# Patient Record
Sex: MALE | Race: WHITE | NOT HISPANIC OR LATINO | Employment: OTHER | URBAN - METROPOLITAN AREA
[De-identification: names, ages, dates, MRNs, and addresses within clinical notes are randomized per-mention and may not be internally consistent; named-entity substitution may affect disease eponyms.]

---

## 2023-04-05 ENCOUNTER — TELEPHONE (OUTPATIENT)
Dept: PRIMARY CARE | Facility: CLINIC | Age: 83
End: 2023-04-05
Payer: MEDICARE

## 2023-04-05 DIAGNOSIS — I10 HYPERTENSION, UNSPECIFIED TYPE: ICD-10-CM

## 2023-04-05 DIAGNOSIS — I10 HYPERTENSION, UNSPECIFIED TYPE: Primary | ICD-10-CM

## 2023-04-05 RX ORDER — QUINAPRIL 40 MG/1
1 TABLET ORAL DAILY
COMMUNITY
End: 2023-04-05 | Stop reason: ALTCHOICE

## 2023-04-05 RX ORDER — LISINOPRIL 20 MG/1
20 TABLET ORAL DAILY
Qty: 90 TABLET | Refills: 3 | Status: SHIPPED | OUTPATIENT
Start: 2023-04-05 | End: 2023-05-09 | Stop reason: SDUPTHER

## 2023-04-05 NOTE — TELEPHONE ENCOUNTER
PT states that [facility] is out of stock of Quinapril 40 mg.  It is on back order.      PT wants to know if there is an alternative?      Only has two pills left.

## 2023-04-05 NOTE — TELEPHONE ENCOUNTER
Patient is taking quinapril 40mg once a day but this is on back order. Pharmacy called and said can you rx something else instead to replace this.

## 2023-05-09 ENCOUNTER — LAB (OUTPATIENT)
Dept: LAB | Facility: LAB | Age: 83
End: 2023-05-09
Payer: MEDICARE

## 2023-05-09 ENCOUNTER — OFFICE VISIT (OUTPATIENT)
Dept: PRIMARY CARE | Facility: CLINIC | Age: 83
End: 2023-05-09
Payer: MEDICARE

## 2023-05-09 VITALS
WEIGHT: 196.4 LBS | HEART RATE: 54 BPM | OXYGEN SATURATION: 97 % | BODY MASS INDEX: 30.83 KG/M2 | HEIGHT: 67 IN | SYSTOLIC BLOOD PRESSURE: 129 MMHG | DIASTOLIC BLOOD PRESSURE: 63 MMHG

## 2023-05-09 DIAGNOSIS — I10 HYPERTENSION, UNSPECIFIED TYPE: ICD-10-CM

## 2023-05-09 DIAGNOSIS — E78.5 HYPERLIPIDEMIA, UNSPECIFIED HYPERLIPIDEMIA TYPE: ICD-10-CM

## 2023-05-09 DIAGNOSIS — I10 ESSENTIAL HYPERTENSION: ICD-10-CM

## 2023-05-09 DIAGNOSIS — R73.9 ELEVATED BLOOD SUGAR: ICD-10-CM

## 2023-05-09 DIAGNOSIS — N40.0 BENIGN PROSTATIC HYPERPLASIA, UNSPECIFIED WHETHER LOWER URINARY TRACT SYMPTOMS PRESENT: ICD-10-CM

## 2023-05-09 DIAGNOSIS — K76.0 FATTY LIVER DISEASE, NONALCOHOLIC: ICD-10-CM

## 2023-05-09 DIAGNOSIS — Z00.00 ROUTINE GENERAL MEDICAL EXAMINATION AT A HEALTH CARE FACILITY: ICD-10-CM

## 2023-05-09 DIAGNOSIS — E78.5 HYPERLIPIDEMIA, UNSPECIFIED HYPERLIPIDEMIA TYPE: Primary | ICD-10-CM

## 2023-05-09 PROCEDURE — 36415 COLL VENOUS BLD VENIPUNCTURE: CPT

## 2023-05-09 PROCEDURE — 3074F SYST BP LT 130 MM HG: CPT | Performed by: INTERNAL MEDICINE

## 2023-05-09 PROCEDURE — 84443 ASSAY THYROID STIM HORMONE: CPT

## 2023-05-09 PROCEDURE — 3078F DIAST BP <80 MM HG: CPT | Performed by: INTERNAL MEDICINE

## 2023-05-09 PROCEDURE — 1159F MED LIST DOCD IN RCRD: CPT | Performed by: INTERNAL MEDICINE

## 2023-05-09 PROCEDURE — 80061 LIPID PANEL: CPT

## 2023-05-09 PROCEDURE — 80053 COMPREHEN METABOLIC PANEL: CPT

## 2023-05-09 PROCEDURE — 85027 COMPLETE CBC AUTOMATED: CPT

## 2023-05-09 PROCEDURE — 99214 OFFICE O/P EST MOD 30 MIN: CPT | Performed by: INTERNAL MEDICINE

## 2023-05-09 PROCEDURE — 83036 HEMOGLOBIN GLYCOSYLATED A1C: CPT

## 2023-05-09 PROCEDURE — 1036F TOBACCO NON-USER: CPT | Performed by: INTERNAL MEDICINE

## 2023-05-09 RX ORDER — HYDROCHLOROTHIAZIDE 12.5 MG/1
1 TABLET ORAL DAILY
COMMUNITY
End: 2023-05-09 | Stop reason: SDUPTHER

## 2023-05-09 RX ORDER — LISINOPRIL 20 MG/1
20 TABLET ORAL DAILY
Qty: 90 TABLET | Refills: 3 | Status: SHIPPED | OUTPATIENT
Start: 2023-05-09 | End: 2023-11-20 | Stop reason: SDUPTHER

## 2023-05-09 RX ORDER — MULTIVITAMIN
TABLET ORAL
COMMUNITY

## 2023-05-09 RX ORDER — QUINAPRIL 40 MG/1
40 TABLET ORAL DAILY
COMMUNITY
End: 2023-05-09 | Stop reason: ALTCHOICE

## 2023-05-09 RX ORDER — PSYLLIUM SEED
PACKET (EA) ORAL
COMMUNITY

## 2023-05-09 RX ORDER — HYDROCHLOROTHIAZIDE 12.5 MG/1
12.5 TABLET ORAL DAILY
Qty: 90 TABLET | Refills: 3 | Status: SHIPPED | OUTPATIENT
Start: 2023-05-09 | End: 2023-11-20 | Stop reason: SDUPTHER

## 2023-05-09 RX ORDER — ATENOLOL 50 MG/1
1 TABLET ORAL DAILY
COMMUNITY
Start: 2018-03-13 | End: 2023-05-09 | Stop reason: SDUPTHER

## 2023-05-09 RX ORDER — LUTEIN 6 MG
20 TABLET ORAL
COMMUNITY
End: 2023-05-09 | Stop reason: SDUPTHER

## 2023-05-09 RX ORDER — OMEPRAZOLE 20 MG/1
20 CAPSULE, DELAYED RELEASE ORAL DAILY
Qty: 90 CAPSULE | Refills: 3 | Status: SHIPPED | OUTPATIENT
Start: 2023-05-09 | End: 2023-12-04 | Stop reason: SDUPTHER

## 2023-05-09 RX ORDER — LUTEIN 6 MG
TABLET ORAL
Qty: 90 TABLET | Refills: 3 | Status: SHIPPED | OUTPATIENT
Start: 2023-05-09 | End: 2023-11-20 | Stop reason: SDUPTHER

## 2023-05-09 RX ORDER — OMEPRAZOLE 20 MG/1
1 CAPSULE, DELAYED RELEASE ORAL DAILY
COMMUNITY
End: 2023-05-09 | Stop reason: SDUPTHER

## 2023-05-09 RX ORDER — ATENOLOL 50 MG/1
50 TABLET ORAL DAILY
Qty: 90 TABLET | Refills: 3 | Status: SHIPPED | OUTPATIENT
Start: 2023-05-09 | End: 2023-11-20 | Stop reason: SDUPTHER

## 2023-05-09 NOTE — PROGRESS NOTES
"Subjective   Patient ID: Gregory Juarez is a 83 y.o. male who presents for the following  Follow up    Medicare wellness November 2022  Assessment/Plan   htn: stable, changed to lisinopril from quinapril      hld: TG elevated. patient will do diet modification     fatty liver disease: recommend diet and weight loss     BPH: getting psa through urologist      [facility] ordered      check cbc cmp, lipid panel and vit d     HPI  male with BPH, fatty infiltration of the liver, urinary outlet obstruction, status post TURP, IGT, HTN, hyperlipidemia, plantar fasciitis, GERD, metabolic syndrome, and BPPV comes for         impaired fasting glucose a1c 5.6 . watching simple carbohydrates      HLD: Patient denies any muscle aches and is tolerating statin therapy     HTN: patient denies any headaches, blurred vision or dizziness. patient denies any stroke like symptoms.      bph: follows with dr carbone. s/p TURP. he gets psa done through urology      fatty liver disease: patient had a n ultrasound in 2015 with the diagnosis           Visit Vitals  /63   Pulse 54   Ht 1.689 m (5' 6.5\")   Wt 89.1 kg (196 lb 6.4 oz)   SpO2 97%   BMI 31.22 kg/m²   Smoking Status Former   BSA 2.04 m²     PHYSICAL EXAM   General appearance: Alert and in no acute distress. speech is clear and coherent  Neck: no carotid bruits or thyromegaly. no lymphadenopathy   Respiratory : No respiratory distress, normal respiratory rhythm and effort. Clear bilateral breath sounds. No wheezing or rhonchi.   Cardiovascular: heart rate regular, S1, S2. no murmurs. no Lower extremity edema  Skin inspection: Normal skin color and pigmentation, normal skin turgor and no visible rash, induration, or cellulitis  MSK: 5/5 strength upper and lower extremities without gait abnormalities. no loss of muscle mass   Neuro: 2-12 CN grossly intact.  no slurred speech. no lateralizing deficit  Psychiatric Orientation: Oriented to person, place, and time. no depression, " homicidal or suicidal thoughts, normal affect  Abdomen: soft, none tender, none distended. no organomegaly      REVIEW OF SYSTEMS   Constitutional: not feeling tired and no fever, chills or sweats. Denies weight loss  no headache  Cardiovascular: no exertional chest pain, no palpitations, no lower extremity edema and no intermittent leg claudication.   Lungs: Denies shortness of breath, exertional dyspnea, wheezing  Gastrointestinal: no change in bowel habits, no diarrhea, no nausea, no vomiting and no abdominal pain. Denies Melena, brbpr or dark stool  Musculoskeletal: no myalgias, no muscle weakness and no limb swelling.   Skin: no rashes, no change in skin color and pigmentation and no skin lumps.   Neurological: no headaches, no seizures, no numbness, no lateralizing deficits and no fainting.   Psychiatric: no depression and no anxiety.   Urine: denies polyuria, hematuria, dysuria       No Known Allergies    Current Outpatient Medications   Medication Sig Dispense Refill    atenolol (Tenormin) 50 mg tablet Take 1 tablet (50 mg) by mouth once daily.      hydroCHLOROthiazide (HYDRODiuril) 12.5 mg tablet Take 1 tablet (12.5 mg) by mouth once daily.      lisinopril 20 mg tablet Take 1 tablet (20 mg) by mouth once daily. 90 tablet 3    lutein 6 mg tablet Take 20 mg by mouth.      multivitamin tablet Take by mouth.      omeprazole (PriLOSEC) 20 mg DR capsule Take 1 capsule (20 mg) by mouth once daily.      psyllium (Metamucil, sugar,) powder Take by mouth.      quinapril (Accupril) 40 mg tablet Take 1 tablet (40 mg) by mouth once daily.       No current facility-administered medications for this visit.       Objective     No visits with results within 4 Month(s) from this visit.   Latest known visit with results is:   [facility] Encounter on 08/09/2022   Component Date Value Ref Range Status    Hemoglobin A1C 08/09/2022 5.6  % Final    Estimated Average Glucose 08/09/2022 114  MG/DL Final    Cholesterol 08/09/2022 130  0  - 199 mg/dL Final    HDL 08/09/2022 39.5 (A)  mg/dL Final    Cholesterol/HDL Ratio 08/09/2022 3.3   Final    LDL 08/09/2022 61  0 - 99 mg/dL Final    VLDL 08/09/2022 29  0 - 40 mg/dL Final    Triglycerides 08/09/2022 147  0 - 149 mg/dL Final    Glucose 08/09/2022 123 (H)  74 - 99 mg/dL Final    Sodium 08/09/2022 136  136 - 145 mmol/L Final    Potassium 08/09/2022 4.6  3.5 - 5.3 mmol/L Final    Chloride 08/09/2022 101  98 - 107 mmol/L Final    Bicarbonate 08/09/2022 27  21 - 32 mmol/L Final    Anion Gap 08/09/2022 13  10 - 20 mmol/L Final    Urea Nitrogen 08/09/2022 18  6 - 23 mg/dL Final    Creatinine 08/09/2022 1.07  0.50 - 1.30 mg/dL Final    GFR MALE 08/09/2022 69  >90 mL/min/1.73m2 Final    Calcium 08/09/2022 9.5  8.6 - 10.6 mg/dL Final    Albumin 08/09/2022 4.5  3.4 - 5.0 g/dL Final    Alkaline Phosphatase 08/09/2022 51  33 - 136 U/L Final    Total Protein 08/09/2022 6.7  6.4 - 8.2 g/dL Final    AST 08/09/2022 27  9 - 39 U/L Final    Total Bilirubin 08/09/2022 0.9  0.0 - 1.2 mg/dL Final    ALT (SGPT) 08/09/2022 28  10 - 52 U/L Final    WBC 08/09/2022 8.1  4.4 - 11.3 x10E9/L Final    nRBC 08/09/2022 0.0  0.0 - 0.0 /100 WBC Final    RBC 08/09/2022 5.30  4.50 - 5.90 x10E12/L Final    Hemoglobin 08/09/2022 15.3  13.5 - 17.5 g/dL Final    Hematocrit 08/09/2022 45.0  41.0 - 52.0 % Final    MCV 08/09/2022 85  80 - 100 fL Final    MCHC 08/09/2022 34.0  32.0 - 36.0 g/dL Final    Platelets 08/09/2022 225  150 - 450 x10E9/L Final    RDW 08/09/2022 13.2  11.5 - 14.5 % Final    Vitamin D, 25-Hydroxy 08/09/2022 60  ng/mL Final       Radiology: Reviewed imaging in powerchart.  No results found.    No family history on file.  Social History     Socioeconomic History    Marital status:      Spouse name: None    Number of children: None    Years of education: None    Highest education level: None   Occupational History    None   Tobacco Use    Smoking status: Former     Types: Cigarettes    Smokeless tobacco: Never    Vaping Use    Vaping status: None   Substance and Sexual Activity    Alcohol use: Not Currently    Drug use: None    Sexual activity: None   Other Topics Concern    None   Social History Narrative    None     Social Determinants of Health     Financial Resource Strain: Not on file   Food Insecurity: Not on file   Transportation Needs: Not on file   Physical Activity: Not on file   Stress: Not on file   Social Connections: Not on file   Intimate Partner Violence: Not on file   Housing Stability: Not on file     Past Medical History:   Diagnosis Date    Benign paroxysmal vertigo, unspecified ear 11/20/2018    BPV (benign positional vertigo)    Personal history of other diseases of the digestive system 04/06/2015    History of esophageal reflux     Past Surgical History:   Procedure Laterality Date    TRANSURETHRAL RESECTION OF PROSTATE  04/06/2015    Transurethral Resection Of Prostate (TURP)       Charting was completed using voice recognition technology and may include unintended errors.

## 2023-05-10 LAB
ALANINE AMINOTRANSFERASE (SGPT) (U/L) IN SER/PLAS: 27 U/L (ref 10–52)
ALBUMIN (G/DL) IN SER/PLAS: 4.8 G/DL (ref 3.4–5)
ALKALINE PHOSPHATASE (U/L) IN SER/PLAS: 57 U/L (ref 33–136)
ANION GAP IN SER/PLAS: 16 MMOL/L (ref 10–20)
ASPARTATE AMINOTRANSFERASE (SGOT) (U/L) IN SER/PLAS: 30 U/L (ref 9–39)
BILIRUBIN TOTAL (MG/DL) IN SER/PLAS: 0.8 MG/DL (ref 0–1.2)
CALCIUM (MG/DL) IN SER/PLAS: 10.4 MG/DL (ref 8.6–10.6)
CARBON DIOXIDE, TOTAL (MMOL/L) IN SER/PLAS: 26 MMOL/L (ref 21–32)
CHLORIDE (MMOL/L) IN SER/PLAS: 101 MMOL/L (ref 98–107)
CHOLESTEROL (MG/DL) IN SER/PLAS: 155 MG/DL (ref 0–199)
CHOLESTEROL IN HDL (MG/DL) IN SER/PLAS: 39.9 MG/DL
CHOLESTEROL/HDL RATIO: 3.9
CREATININE (MG/DL) IN SER/PLAS: 1.11 MG/DL (ref 0.5–1.3)
ERYTHROCYTE DISTRIBUTION WIDTH (RATIO) BY AUTOMATED COUNT: 13.7 % (ref 11.5–14.5)
ERYTHROCYTE MEAN CORPUSCULAR HEMOGLOBIN CONCENTRATION (G/DL) BY AUTOMATED: 33 G/DL (ref 32–36)
ERYTHROCYTE MEAN CORPUSCULAR VOLUME (FL) BY AUTOMATED COUNT: 89 FL (ref 80–100)
ERYTHROCYTES (10*6/UL) IN BLOOD BY AUTOMATED COUNT: 5.43 X10E12/L (ref 4.5–5.9)
ESTIMATED AVERAGE GLUCOSE FOR HBA1C: 111 MG/DL
GFR MALE: 66 ML/MIN/1.73M2
GLUCOSE (MG/DL) IN SER/PLAS: 119 MG/DL (ref 74–99)
HEMATOCRIT (%) IN BLOOD BY AUTOMATED COUNT: 48.5 % (ref 41–52)
HEMOGLOBIN (G/DL) IN BLOOD: 16 G/DL (ref 13.5–17.5)
HEMOGLOBIN A1C/HEMOGLOBIN TOTAL IN BLOOD: 5.5 %
LDL: 68 MG/DL (ref 0–99)
LEUKOCYTES (10*3/UL) IN BLOOD BY AUTOMATED COUNT: 8.8 X10E9/L (ref 4.4–11.3)
NON HDL CHOLESTEROL: 115 MG/DL
NRBC (PER 100 WBCS) BY AUTOMATED COUNT: 0 /100 WBC (ref 0–0)
PLATELETS (10*3/UL) IN BLOOD AUTOMATED COUNT: 218 X10E9/L (ref 150–450)
POTASSIUM (MMOL/L) IN SER/PLAS: 4.7 MMOL/L (ref 3.5–5.3)
PROTEIN TOTAL: 7.1 G/DL (ref 6.4–8.2)
SODIUM (MMOL/L) IN SER/PLAS: 138 MMOL/L (ref 136–145)
THYROTROPIN (MIU/L) IN SER/PLAS BY DETECTION LIMIT <= 0.05 MIU/L: 1.51 MIU/L (ref 0.44–3.98)
TRIGLYCERIDE (MG/DL) IN SER/PLAS: 234 MG/DL (ref 0–149)
UREA NITROGEN (MG/DL) IN SER/PLAS: 18 MG/DL (ref 6–23)
VLDL: 47 MG/DL (ref 0–40)

## 2023-05-16 ENCOUNTER — TELEPHONE (OUTPATIENT)
Dept: PRIMARY CARE | Facility: CLINIC | Age: 83
End: 2023-05-16
Payer: MEDICARE

## 2023-05-16 NOTE — TELEPHONE ENCOUNTER
----- Message from Robinson Smith DO sent at 5/12/2023  5:26 PM EDT -----  triglycerides are elevated, please limit simple carbohydrates and continue to do lifestyle modifications including dieting and exercising

## 2023-05-22 ENCOUNTER — TELEPHONE (OUTPATIENT)
Dept: PRIMARY CARE | Facility: CLINIC | Age: 83
End: 2023-05-22
Payer: MEDICARE

## 2023-08-15 ENCOUNTER — TELEPHONE (OUTPATIENT)
Dept: PRIMARY CARE | Facility: CLINIC | Age: 83
End: 2023-08-15
Payer: MEDICARE

## 2023-08-15 RX ORDER — NIRMATRELVIR AND RITONAVIR 150-100 MG
KIT ORAL
COMMUNITY
Start: 2023-08-12

## 2023-11-07 ENCOUNTER — APPOINTMENT (OUTPATIENT)
Dept: PRIMARY CARE | Facility: CLINIC | Age: 83
End: 2023-11-07
Payer: MEDICARE

## 2023-11-08 ENCOUNTER — OFFICE VISIT (OUTPATIENT)
Dept: PRIMARY CARE | Facility: CLINIC | Age: 83
End: 2023-11-08
Payer: MEDICARE

## 2023-11-08 VITALS
TEMPERATURE: 97 F | DIASTOLIC BLOOD PRESSURE: 70 MMHG | WEIGHT: 196 LBS | BODY MASS INDEX: 30.76 KG/M2 | SYSTOLIC BLOOD PRESSURE: 102 MMHG | HEIGHT: 67 IN | HEART RATE: 64 BPM

## 2023-11-08 DIAGNOSIS — Z00.00 WELLNESS EXAMINATION: Primary | ICD-10-CM

## 2023-11-08 DIAGNOSIS — E55.9 VITAMIN D DEFICIENCY: ICD-10-CM

## 2023-11-08 DIAGNOSIS — R73.01 IMPAIRED FASTING GLUCOSE: ICD-10-CM

## 2023-11-08 DIAGNOSIS — I10 HYPERTENSION, UNSPECIFIED TYPE: ICD-10-CM

## 2023-11-08 DIAGNOSIS — E78.5 HYPERLIPIDEMIA, UNSPECIFIED HYPERLIPIDEMIA TYPE: ICD-10-CM

## 2023-11-08 PROCEDURE — 1170F FXNL STATUS ASSESSED: CPT | Performed by: INTERNAL MEDICINE

## 2023-11-08 PROCEDURE — G0439 PPPS, SUBSEQ VISIT: HCPCS | Performed by: INTERNAL MEDICINE

## 2023-11-08 PROCEDURE — 3078F DIAST BP <80 MM HG: CPT | Performed by: INTERNAL MEDICINE

## 2023-11-08 PROCEDURE — 1159F MED LIST DOCD IN RCRD: CPT | Performed by: INTERNAL MEDICINE

## 2023-11-08 PROCEDURE — 3074F SYST BP LT 130 MM HG: CPT | Performed by: INTERNAL MEDICINE

## 2023-11-08 PROCEDURE — 1160F RVW MEDS BY RX/DR IN RCRD: CPT | Performed by: INTERNAL MEDICINE

## 2023-11-08 PROCEDURE — 99214 OFFICE O/P EST MOD 30 MIN: CPT | Performed by: INTERNAL MEDICINE

## 2023-11-08 PROCEDURE — 1036F TOBACCO NON-USER: CPT | Performed by: INTERNAL MEDICINE

## 2023-11-08 ASSESSMENT — ACTIVITIES OF DAILY LIVING (ADL)
TAKING_MEDICATION: INDEPENDENT
DOING_HOUSEWORK: INDEPENDENT
BATHING: INDEPENDENT
GROCERY_SHOPPING: INDEPENDENT
MANAGING_FINANCES: INDEPENDENT
DRESSING: INDEPENDENT

## 2023-11-08 ASSESSMENT — PATIENT HEALTH QUESTIONNAIRE - PHQ9
1. LITTLE INTEREST OR PLEASURE IN DOING THINGS: NOT AT ALL
2. FEELING DOWN, DEPRESSED OR HOPELESS: NOT AT ALL
SUM OF ALL RESPONSES TO PHQ9 QUESTIONS 1 AND 2: 0

## 2023-11-08 NOTE — PROGRESS NOTES
"Subjective   Patient ID: Gregory Juarez is a 83 y.o. male who presents for the following     Medicare wellness November 2023 and chronic follow   Assessment/Plan   htn: stable, changed to lisinopril from quinapril      hld: TG elevated. patient will do diet modification     fatty liver disease: recommend diet and weight loss     BPH: getting psa through urologist < Dr Rafa FONSECA ordered      check cbc cmp, lipid panel and vit d     HPI  male with BPH, fatty infiltration of the liver, urinary outlet obstruction, status post TURP, IGT, HTN, hyperlipidemia, plantar fasciitis, GERD, metabolic syndrome, and BPPV comes for         impaired fasting glucose a1c 5.6 . watching simple carbohydrates      HLD: Patient denies any muscle aches and is tolerating statin therapy     HTN: patient denies any headaches, blurred vision or dizziness. patient denies any stroke like symptoms.      bph: follows with dr carbone. s/p TURP. he gets psa done through urology      fatty liver disease: patient had a n ultrasound in 2015 with the diagnosis        to [facility]     Visit Vitals  /70   Pulse 64   Temp 36.1 °C (97 °F)   Ht 1.689 m (5' 6.5\")   Wt 88.9 kg (196 lb)   BMI 31.16 kg/m²   Smoking Status Former   BSA 2.04 m²     PHYSICAL EXAM   General appearance: Alert and in no acute distress. speech is clear and coherent  Neck: no carotid bruits or thyromegaly. no lymphadenopathy   Respiratory : No respiratory distress, normal respiratory rhythm and effort. Clear bilateral breath sounds. No wheezing or rhonchi.   Cardiovascular: heart rate regular, S1, S2. no murmurs. no Lower extremity edema  Skin inspection: Normal skin color and pigmentation, normal skin turgor and no visible rash, induration, or cellulitis  MSK: 5/5 strength upper and lower extremities without gait abnormalities. no loss of muscle mass   Neuro: 2-12 CN grossly intact.  no slurred speech. no lateralizing deficit  Psychiatric Orientation: " Oriented to person, place, and time. no depression, homicidal or suicidal thoughts, normal affect  Abdomen: soft, none tender, none distended. no organomegaly      REVIEW OF SYSTEMS   Constitutional: not feeling tired and no fever, chills or sweats. Denies weight loss  no headache  Cardiovascular: no exertional chest pain, no palpitations, no lower extremity edema and no intermittent leg claudication.   Lungs: Denies shortness of breath, exertional dyspnea, wheezing  Gastrointestinal: no change in bowel habits, no diarrhea, no nausea, no vomiting and no abdominal pain. Denies Melena, brbpr or dark stool  Musculoskeletal: no myalgias, no muscle weakness and no limb swelling.   Skin: no rashes, no change in skin color and pigmentation and no skin lumps.   Neurological: no headaches, no seizures, no numbness, no lateralizing deficits and no fainting.   Psychiatric: no depression and no anxiety.   Urine: denies polyuria, hematuria, dysuria       No Known Allergies    Current Outpatient Medications   Medication Sig Dispense Refill    atenolol (Tenormin) 50 mg tablet Take 1 tablet (50 mg) by mouth once daily. 90 tablet 3    hydroCHLOROthiazide (HYDRODiuril) 12.5 mg tablet Take 1 tablet (12.5 mg) by mouth once daily. 90 tablet 3    lutein 6 mg tablet Use as directed 90 tablet 3    multivitamin tablet Take by mouth.      omeprazole (PriLOSEC) 20 mg DR capsule Take 1 capsule (20 mg) by mouth once daily. 90 capsule 3    Paxlovid 150-100 mg tablet therapy pack TK 1 NIRMATRELVIR T AND 1 RITONAVIR T TOGETHER PO BID FOR 5 DAYS BID FOR 5 DAYS      psyllium (Metamucil, sugar,) powder Take by mouth.      lisinopril 20 mg tablet Take 1 tablet (20 mg) by mouth once daily. 90 tablet 3     No current facility-administered medications for this visit.       Objective     No visits with results within 4 Month(s) from this visit.   Latest known visit with results is:   Lab on 05/09/2023   Component Date Value Ref Range Status    WBC  05/09/2023 8.8  4.4 - 11.3 x10E9/L Final    nRBC 05/09/2023 0.0  0.0 - 0.0 /100 WBC Final    RBC 05/09/2023 5.43  4.50 - 5.90 x10E12/L Final    Hemoglobin 05/09/2023 16.0  13.5 - 17.5 g/dL Final    Hematocrit 05/09/2023 48.5  41.0 - 52.0 % Final    MCV 05/09/2023 89  80 - 100 fL Final    MCHC 05/09/2023 33.0  32.0 - 36.0 g/dL Final    Platelets 05/09/2023 218  150 - 450 x10E9/L Final    RDW 05/09/2023 13.7  11.5 - 14.5 % Final    Glucose 05/09/2023 119 (H)  74 - 99 mg/dL Final    Sodium 05/09/2023 138  136 - 145 mmol/L Final    Potassium 05/09/2023 4.7  3.5 - 5.3 mmol/L Final    Chloride 05/09/2023 101  98 - 107 mmol/L Final    Bicarbonate 05/09/2023 26  21 - 32 mmol/L Final    Anion Gap 05/09/2023 16  10 - 20 mmol/L Final    Urea Nitrogen 05/09/2023 18  6 - 23 mg/dL Final    Creatinine 05/09/2023 1.11  0.50 - 1.30 mg/dL Final    GFR MALE 05/09/2023 66  >90 mL/min/1.73m2 Final    Calcium 05/09/2023 10.4  8.6 - 10.6 mg/dL Final    Albumin 05/09/2023 4.8  3.4 - 5.0 g/dL Final    Alkaline Phosphatase 05/09/2023 57  33 - 136 U/L Final    Total Protein 05/09/2023 7.1  6.4 - 8.2 g/dL Final    AST 05/09/2023 30  9 - 39 U/L Final    Total Bilirubin 05/09/2023 0.8  0.0 - 1.2 mg/dL Final    ALT (SGPT) 05/09/2023 27  10 - 52 U/L Final    Hemoglobin A1C 05/09/2023 5.5  % Final    Estimated Average Glucose 05/09/2023 111  MG/DL Final    Cholesterol 05/09/2023 155  0 - 199 mg/dL Final    HDL 05/09/2023 39.9 (A)  mg/dL Final    Cholesterol/HDL Ratio 05/09/2023 3.9   Final    LDL 05/09/2023 68  0 - 99 mg/dL Final    VLDL 05/09/2023 47 (H)  0 - 40 mg/dL Final    Triglycerides 05/09/2023 234 (H)  0 - 149 mg/dL Final    Non HDL Cholesterol 05/09/2023 115  mg/dL Final    TSH 05/09/2023 1.51  0.44 - 3.98 mIU/L Final       Radiology: Reviewed imaging in powerchart.  No results found.    No family history on file.  Social History     Socioeconomic History    Marital status:      Spouse name: None    Number of children: None     Years of education: None    Highest education level: None   Occupational History    None   Tobacco Use    Smoking status: Former     Types: Cigarettes    Smokeless tobacco: Never   Substance and Sexual Activity    Alcohol use: Not Currently    Drug use: None    Sexual activity: None   Other Topics Concern    None   Social History Narrative    None     Social Determinants of Health     Financial Resource Strain: Not on file   Food Insecurity: Not on file   Transportation Needs: Not on file   Physical Activity: Not on file   Stress: Not on file   Social Connections: Not on file   Intimate Partner Violence: Not on file   Housing Stability: Not on file     Past Medical History:   Diagnosis Date    Benign paroxysmal vertigo, unspecified ear 11/20/2018    BPV (benign positional vertigo)    Personal history of other diseases of the digestive system 04/06/2015    History of esophageal reflux     Past Surgical History:   Procedure Laterality Date    TRANSURETHRAL RESECTION OF PROSTATE  04/06/2015    Transurethral Resection Of Prostate (TURP)       Charting was completed using voice recognition technology and may include unintended errors.

## 2023-11-17 ENCOUNTER — TELEPHONE (OUTPATIENT)
Dept: PRIMARY CARE | Facility: CLINIC | Age: 83
End: 2023-11-17
Payer: MEDICARE

## 2023-11-17 DIAGNOSIS — I10 HYPERTENSION, UNSPECIFIED TYPE: ICD-10-CM

## 2023-11-17 DIAGNOSIS — I10 ESSENTIAL HYPERTENSION: ICD-10-CM

## 2023-11-17 DIAGNOSIS — Z00.00 ROUTINE GENERAL MEDICAL EXAMINATION AT A HEALTH CARE FACILITY: ICD-10-CM

## 2023-11-20 ENCOUNTER — TELEPHONE (OUTPATIENT)
Dept: PRIMARY CARE | Facility: CLINIC | Age: 83
End: 2023-11-20
Payer: MEDICARE

## 2023-11-20 RX ORDER — HYDROCHLOROTHIAZIDE 12.5 MG/1
12.5 TABLET ORAL DAILY
Qty: 90 TABLET | Refills: 3 | Status: SHIPPED | OUTPATIENT
Start: 2023-11-20 | End: 2023-12-04 | Stop reason: SDUPTHER

## 2023-11-20 RX ORDER — ATENOLOL 50 MG/1
50 TABLET ORAL DAILY
Qty: 90 TABLET | Refills: 3 | Status: SHIPPED | OUTPATIENT
Start: 2023-11-20 | End: 2023-12-04 | Stop reason: SDUPTHER

## 2023-11-20 RX ORDER — LISINOPRIL 20 MG/1
20 TABLET ORAL DAILY
Qty: 90 TABLET | Refills: 3 | Status: SHIPPED | OUTPATIENT
Start: 2023-11-20 | End: 2023-12-04 | Stop reason: SDUPTHER

## 2023-11-20 RX ORDER — LUTEIN 6 MG
TABLET ORAL
Qty: 90 TABLET | Refills: 3 | Status: SHIPPED | OUTPATIENT
Start: 2023-11-20

## 2023-12-04 ENCOUNTER — TELEPHONE (OUTPATIENT)
Dept: PRIMARY CARE | Facility: CLINIC | Age: 83
End: 2023-12-04
Payer: MEDICARE

## 2023-12-04 DIAGNOSIS — Z00.00 ROUTINE GENERAL MEDICAL EXAMINATION AT A HEALTH CARE FACILITY: ICD-10-CM

## 2023-12-04 DIAGNOSIS — I10 ESSENTIAL HYPERTENSION: ICD-10-CM

## 2023-12-04 DIAGNOSIS — I10 HYPERTENSION, UNSPECIFIED TYPE: ICD-10-CM

## 2023-12-05 RX ORDER — HYDROCHLOROTHIAZIDE 12.5 MG/1
12.5 TABLET ORAL DAILY
Qty: 90 TABLET | Refills: 3 | Status: SHIPPED | OUTPATIENT
Start: 2023-12-05

## 2023-12-05 RX ORDER — OMEPRAZOLE 20 MG/1
20 CAPSULE, DELAYED RELEASE ORAL DAILY
Qty: 90 CAPSULE | Refills: 3 | Status: SHIPPED | OUTPATIENT
Start: 2023-12-05

## 2023-12-05 RX ORDER — LISINOPRIL 20 MG/1
20 TABLET ORAL DAILY
Qty: 90 TABLET | Refills: 3 | Status: SHIPPED | OUTPATIENT
Start: 2023-12-05

## 2023-12-05 RX ORDER — ATENOLOL 50 MG/1
50 TABLET ORAL DAILY
Qty: 90 TABLET | Refills: 3 | Status: SHIPPED | OUTPATIENT
Start: 2023-12-05

## 2024-02-27 ENCOUNTER — LAB (OUTPATIENT)
Dept: LAB | Facility: LAB | Age: 84
End: 2024-02-27
Payer: MEDICARE

## 2024-02-27 DIAGNOSIS — R73.01 IMPAIRED FASTING GLUCOSE: ICD-10-CM

## 2024-02-27 DIAGNOSIS — I10 HYPERTENSION, UNSPECIFIED TYPE: ICD-10-CM

## 2024-02-27 DIAGNOSIS — E78.5 HYPERLIPIDEMIA, UNSPECIFIED HYPERLIPIDEMIA TYPE: ICD-10-CM

## 2024-02-27 DIAGNOSIS — E55.9 VITAMIN D DEFICIENCY: ICD-10-CM

## 2024-02-27 LAB
25(OH)D3 SERPL-MCNC: 81 NG/ML (ref 30–100)
ALBUMIN SERPL BCP-MCNC: 4.4 G/DL (ref 3.4–5)
ALP SERPL-CCNC: 49 U/L (ref 33–136)
ALT SERPL W P-5'-P-CCNC: 21 U/L (ref 10–52)
ANION GAP SERPL CALC-SCNC: 16 MMOL/L (ref 10–20)
AST SERPL W P-5'-P-CCNC: 20 U/L (ref 9–39)
BILIRUB SERPL-MCNC: 1 MG/DL (ref 0–1.2)
BUN SERPL-MCNC: 20 MG/DL (ref 6–23)
CALCIUM SERPL-MCNC: 10.2 MG/DL (ref 8.6–10.6)
CHLORIDE SERPL-SCNC: 100 MMOL/L (ref 98–107)
CHOLEST SERPL-MCNC: 146 MG/DL (ref 0–199)
CHOLESTEROL/HDL RATIO: 3.9
CO2 SERPL-SCNC: 27 MMOL/L (ref 21–32)
CREAT SERPL-MCNC: 1.19 MG/DL (ref 0.5–1.3)
EGFRCR SERPLBLD CKD-EPI 2021: 61 ML/MIN/1.73M*2
ERYTHROCYTE [DISTWIDTH] IN BLOOD BY AUTOMATED COUNT: 13.6 % (ref 11.5–14.5)
EST. AVERAGE GLUCOSE BLD GHB EST-MCNC: 108 MG/DL
GLUCOSE SERPL-MCNC: 112 MG/DL (ref 74–99)
HBA1C MFR BLD: 5.4 %
HCT VFR BLD AUTO: 46.8 % (ref 41–52)
HDLC SERPL-MCNC: 37.3 MG/DL
HGB BLD-MCNC: 15.2 G/DL (ref 13.5–17.5)
LDLC SERPL CALC-MCNC: 56 MG/DL
MCH RBC QN AUTO: 28.4 PG (ref 26–34)
MCHC RBC AUTO-ENTMCNC: 32.5 G/DL (ref 32–36)
MCV RBC AUTO: 87 FL (ref 80–100)
NON HDL CHOLESTEROL: 109 MG/DL (ref 0–149)
NRBC BLD-RTO: 0 /100 WBCS (ref 0–0)
PLATELET # BLD AUTO: 217 X10*3/UL (ref 150–450)
POTASSIUM SERPL-SCNC: 4.6 MMOL/L (ref 3.5–5.3)
PROT SERPL-MCNC: 6.6 G/DL (ref 6.4–8.2)
RBC # BLD AUTO: 5.36 X10*6/UL (ref 4.5–5.9)
SODIUM SERPL-SCNC: 138 MMOL/L (ref 136–145)
TRIGL SERPL-MCNC: 262 MG/DL (ref 0–149)
TSH SERPL-ACNC: 1.65 MIU/L (ref 0.44–3.98)
VLDL: 52 MG/DL (ref 0–40)
WBC # BLD AUTO: 8 X10*3/UL (ref 4.4–11.3)

## 2024-02-27 PROCEDURE — 85027 COMPLETE CBC AUTOMATED: CPT

## 2024-02-27 PROCEDURE — 36415 COLL VENOUS BLD VENIPUNCTURE: CPT

## 2024-02-27 PROCEDURE — 80053 COMPREHEN METABOLIC PANEL: CPT

## 2024-02-27 PROCEDURE — 84443 ASSAY THYROID STIM HORMONE: CPT

## 2024-02-27 PROCEDURE — 83036 HEMOGLOBIN GLYCOSYLATED A1C: CPT

## 2024-02-27 PROCEDURE — 80061 LIPID PANEL: CPT

## 2024-02-27 PROCEDURE — 82306 VITAMIN D 25 HYDROXY: CPT

## 2024-03-01 ENCOUNTER — TELEPHONE (OUTPATIENT)
Dept: PRIMARY CARE | Facility: CLINIC | Age: 84
End: 2024-03-01
Payer: MEDICARE

## 2024-03-01 NOTE — TELEPHONE ENCOUNTER
----- Message from Robinson Smith DO sent at 3/1/2024  3:31 PM EST -----  triglycerides are elevated, please limit simple carbohydrates and continue to do lifestyle modifications including dieting and exercising

## 2024-05-13 ENCOUNTER — APPOINTMENT (OUTPATIENT)
Dept: PRIMARY CARE | Facility: CLINIC | Age: 84
End: 2024-05-13
Payer: MEDICARE

## 2024-06-12 ENCOUNTER — APPOINTMENT (OUTPATIENT)
Dept: PRIMARY CARE | Facility: CLINIC | Age: 84
End: 2024-06-12
Payer: MEDICARE

## 2024-06-12 ENCOUNTER — LAB (OUTPATIENT)
Dept: LAB | Facility: LAB | Age: 84
End: 2024-06-12
Payer: MEDICARE

## 2024-06-12 VITALS
WEIGHT: 187 LBS | DIASTOLIC BLOOD PRESSURE: 75 MMHG | HEIGHT: 67 IN | SYSTOLIC BLOOD PRESSURE: 129 MMHG | HEART RATE: 53 BPM | BODY MASS INDEX: 29.35 KG/M2

## 2024-06-12 DIAGNOSIS — I10 HYPERTENSION, UNSPECIFIED TYPE: Primary | ICD-10-CM

## 2024-06-12 DIAGNOSIS — I10 HYPERTENSION, UNSPECIFIED TYPE: ICD-10-CM

## 2024-06-12 DIAGNOSIS — R73.01 IMPAIRED FASTING GLUCOSE: ICD-10-CM

## 2024-06-12 DIAGNOSIS — R58 ECCHYMOSIS: ICD-10-CM

## 2024-06-12 DIAGNOSIS — E78.5 HYPERLIPIDEMIA, UNSPECIFIED HYPERLIPIDEMIA TYPE: ICD-10-CM

## 2024-06-12 LAB
ALBUMIN SERPL BCP-MCNC: 4.6 G/DL (ref 3.4–5)
ALP SERPL-CCNC: 57 U/L (ref 33–136)
ALT SERPL W P-5'-P-CCNC: 23 U/L (ref 10–52)
ANION GAP SERPL CALC-SCNC: 16 MMOL/L (ref 10–20)
AST SERPL W P-5'-P-CCNC: 24 U/L (ref 9–39)
BILIRUB SERPL-MCNC: 0.8 MG/DL (ref 0–1.2)
BUN SERPL-MCNC: 20 MG/DL (ref 6–23)
CALCIUM SERPL-MCNC: 10 MG/DL (ref 8.6–10.6)
CHLORIDE SERPL-SCNC: 103 MMOL/L (ref 98–107)
CO2 SERPL-SCNC: 22 MMOL/L (ref 21–32)
CREAT SERPL-MCNC: 1.01 MG/DL (ref 0.5–1.3)
EGFRCR SERPLBLD CKD-EPI 2021: 73 ML/MIN/1.73M*2
ERYTHROCYTE [DISTWIDTH] IN BLOOD BY AUTOMATED COUNT: 13 % (ref 11.5–14.5)
EST. AVERAGE GLUCOSE BLD GHB EST-MCNC: 114 MG/DL
GLUCOSE SERPL-MCNC: 111 MG/DL (ref 74–99)
HBA1C MFR BLD: 5.6 %
HCT VFR BLD AUTO: 44.7 % (ref 41–52)
HGB BLD-MCNC: 15.4 G/DL (ref 13.5–17.5)
INR PPP: 1.1 (ref 0.9–1.1)
MCH RBC QN AUTO: 29.3 PG (ref 26–34)
MCHC RBC AUTO-ENTMCNC: 34.5 G/DL (ref 32–36)
MCV RBC AUTO: 85 FL (ref 80–100)
NRBC BLD-RTO: 0 /100 WBCS (ref 0–0)
PLATELET # BLD AUTO: 207 X10*3/UL (ref 150–450)
POTASSIUM SERPL-SCNC: 3.9 MMOL/L (ref 3.5–5.3)
PROT SERPL-MCNC: 6.9 G/DL (ref 6.4–8.2)
PROTHROMBIN TIME: 12.1 SECONDS (ref 9.8–12.8)
RBC # BLD AUTO: 5.26 X10*6/UL (ref 4.5–5.9)
SODIUM SERPL-SCNC: 137 MMOL/L (ref 136–145)
TSH SERPL-ACNC: 1.43 MIU/L (ref 0.44–3.98)
WBC # BLD AUTO: 8.1 X10*3/UL (ref 4.4–11.3)

## 2024-06-12 PROCEDURE — 36415 COLL VENOUS BLD VENIPUNCTURE: CPT

## 2024-06-12 PROCEDURE — 1159F MED LIST DOCD IN RCRD: CPT | Performed by: INTERNAL MEDICINE

## 2024-06-12 PROCEDURE — 3078F DIAST BP <80 MM HG: CPT | Performed by: INTERNAL MEDICINE

## 2024-06-12 PROCEDURE — 3074F SYST BP LT 130 MM HG: CPT | Performed by: INTERNAL MEDICINE

## 2024-06-12 PROCEDURE — 84443 ASSAY THYROID STIM HORMONE: CPT

## 2024-06-12 PROCEDURE — G2211 COMPLEX E/M VISIT ADD ON: HCPCS | Performed by: INTERNAL MEDICINE

## 2024-06-12 PROCEDURE — 85610 PROTHROMBIN TIME: CPT

## 2024-06-12 PROCEDURE — 99214 OFFICE O/P EST MOD 30 MIN: CPT | Performed by: INTERNAL MEDICINE

## 2024-06-12 PROCEDURE — 1036F TOBACCO NON-USER: CPT | Performed by: INTERNAL MEDICINE

## 2024-06-12 PROCEDURE — 85027 COMPLETE CBC AUTOMATED: CPT

## 2024-06-12 PROCEDURE — 80053 COMPREHEN METABOLIC PANEL: CPT

## 2024-06-12 PROCEDURE — 83036 HEMOGLOBIN GLYCOSYLATED A1C: CPT

## 2024-06-12 NOTE — PROGRESS NOTES
"Subjective   Patient ID: Gregory Juarez is a 84 y.o. male who presents for the following  chronic follow  Medicare wellness November 2023    Assessment/Plan   Ecchymosis on the forearms: check inr    htn: stable, changed to lisinopril from quinapril      hld: TG elevated. patient will do diet modification     fatty liver disease: recommend diet and weight loss     BPH: getting psa through urologist < Dr Rafa FONSECA ordered      check cbc cmp, lipid panel and vit d     HPI  male with BPH, fatty infiltration of the liver, urinary outlet obstruction, status post TURP, IGT, HTN, hyperlipidemia, plantar fasciitis, GERD, metabolic syndrome, and BPPV comes for       bruising more frequently on his forarms noted. Denies any juan rectal bleeding    impaired fasting glucose a1c 5.6 . watching simple carbohydrates      HLD: Patient denies any muscle aches and is tolerating statin therapy     HTN: patient denies any headaches, blurred vision or dizziness. patient denies any stroke like symptoms.      bph: follows with dr carbone. s/p TURP. he gets psa done through urology      fatty liver disease: patient had a n ultrasound in 2015 with the diagnosis        to Fabiola     Visit Vitals  /75 (BP Location: Left arm, Patient Position: Sitting, BP Cuff Size: Adult)   Pulse 53   Ht 1.689 m (5' 6.5\")   Wt 84.8 kg (187 lb)   BMI 29.73 kg/m²   Smoking Status Former   BSA 1.99 m²     PHYSICAL EXAM   General appearance: Alert and in no acute distress. speech is clear and coherent    Respiratory : No respiratory distress, normal respiratory rhythm and effort. Clear bilateral breath sounds. No wheezing or rhonchi.   Cardiovascular: heart rate regular, S1, S2. no murmurs. no Lower extremity edema  Skin inspection: Normal skin color and pigmentation, normal skin turgor and no visible rash, induration, or cellulitis. Ecchymosis on the upper forearms.   MSK: 5/5 strength upper and lower extremities without gait " abnormalities. no loss of muscle mass   Neuro: 2-12 CN grossly intact.  no slurred speech. no lateralizing deficit  Psychiatric Orientation: Oriented to person, place, and time. no depression, homicidal or suicidal thoughts, normal affect       REVIEW OF SYSTEMS   Constitutional: not feeling tired and no fever, chills or sweats. Denies weight loss  no headache  Cardiovascular: no exertional chest pain, no palpitations, no lower extremity edema    Lungs: Denies shortness of breath, exertional dyspnea, wheezing  Gastrointestinal: no change in bowel habits, no diarrhea, no nausea, no vomiting and no abdominal pain. Denies Melena, brbpr or dark stool  Musculoskeletal: no myalgias, no muscle weakness and no limb swelling.    Neurological: no headaches, no seizures, no numbness, no lateralizing deficits and no fainting.   Psychiatric: no depression and no anxiety.   Urine: denies polyuria, hematuria, dysuria       No Known Allergies    Current Outpatient Medications   Medication Sig Dispense Refill    atenolol (Tenormin) 50 mg tablet Take 1 tablet (50 mg) by mouth once daily. 90 tablet 3    hydroCHLOROthiazide (HYDRODiuril) 12.5 mg tablet Take 1 tablet (12.5 mg) by mouth once daily. 90 tablet 3    lisinopril 20 mg tablet Take 1 tablet (20 mg) by mouth once daily. 90 tablet 3    lutein 6 mg tablet Use as directed 90 tablet 3    multivitamin tablet Take by mouth.      omeprazole (PriLOSEC) 20 mg DR capsule Take 1 capsule (20 mg) by mouth once daily. 90 capsule 3    Paxlovid 150-100 mg tablet therapy pack TK 1 NIRMATRELVIR T AND 1 RITONAVIR T TOGETHER PO BID FOR 5 DAYS BID FOR 5 DAYS      psyllium (Metamucil, sugar,) powder Take by mouth.       No current facility-administered medications for this visit.       Objective     Lab on 02/27/2024   Component Date Value Ref Range Status    WBC 02/27/2024 8.0  4.4 - 11.3 x10*3/uL Final    nRBC 02/27/2024 0.0  0.0 - 0.0 /100 WBCs Final    RBC 02/27/2024 5.36  4.50 - 5.90 x10*6/uL  Final    Hemoglobin 02/27/2024 15.2  13.5 - 17.5 g/dL Final    Hematocrit 02/27/2024 46.8  41.0 - 52.0 % Final    MCV 02/27/2024 87  80 - 100 fL Final    MCH 02/27/2024 28.4  26.0 - 34.0 pg Final    MCHC 02/27/2024 32.5  32.0 - 36.0 g/dL Final    RDW 02/27/2024 13.6  11.5 - 14.5 % Final    Platelets 02/27/2024 217  150 - 450 x10*3/uL Final    Glucose 02/27/2024 112 (H)  74 - 99 mg/dL Final    Sodium 02/27/2024 138  136 - 145 mmol/L Final    Potassium 02/27/2024 4.6  3.5 - 5.3 mmol/L Final    Chloride 02/27/2024 100  98 - 107 mmol/L Final    Bicarbonate 02/27/2024 27  21 - 32 mmol/L Final    Anion Gap 02/27/2024 16  10 - 20 mmol/L Final    Urea Nitrogen 02/27/2024 20  6 - 23 mg/dL Final    Creatinine 02/27/2024 1.19  0.50 - 1.30 mg/dL Final    eGFR 02/27/2024 61  >60 mL/min/1.73m*2 Final    Calcium 02/27/2024 10.2  8.6 - 10.6 mg/dL Final    Albumin 02/27/2024 4.4  3.4 - 5.0 g/dL Final    Alkaline Phosphatase 02/27/2024 49  33 - 136 U/L Final    Total Protein 02/27/2024 6.6  6.4 - 8.2 g/dL Final    AST 02/27/2024 20  9 - 39 U/L Final    Bilirubin, Total 02/27/2024 1.0  0.0 - 1.2 mg/dL Final    ALT 02/27/2024 21  10 - 52 U/L Final    Hemoglobin A1C 02/27/2024 5.4  see below % Final    Estimated Average Glucose 02/27/2024 108  Not Established mg/dL Final    Cholesterol 02/27/2024 146  0 - 199 mg/dL Final    HDL-Cholesterol 02/27/2024 37.3  mg/dL Final    Cholesterol/HDL Ratio 02/27/2024 3.9   Final    LDL Calculated 02/27/2024 56  <=99 mg/dL Final    VLDL 02/27/2024 52 (H)  0 - 40 mg/dL Final    Triglycerides 02/27/2024 262 (H)  0 - 149 mg/dL Final    Non HDL Cholesterol 02/27/2024 109  0 - 149 mg/dL Final    Thyroid Stimulating Hormone 02/27/2024 1.65  0.44 - 3.98 mIU/L Final    Vitamin D, 25-Hydroxy, Total 02/27/2024 81  30 - 100 ng/mL Final       Radiology: Reviewed imaging in powerchart.  No results found.    No family history on file.  Social History     Socioeconomic History    Marital status:      Spouse  name: None    Number of children: None    Years of education: None    Highest education level: None   Occupational History    None   Tobacco Use    Smoking status: Former     Types: Cigarettes    Smokeless tobacco: Never   Substance and Sexual Activity    Alcohol use: Not Currently    Drug use: None    Sexual activity: None   Other Topics Concern    None   Social History Narrative    None     Social Determinants of Health     Financial Resource Strain: Not on file   Food Insecurity: Not on file   Transportation Needs: Not on file   Physical Activity: Not on file   Stress: Not on file   Social Connections: Not on file   Intimate Partner Violence: Not on file   Housing Stability: Not on file     Past Medical History:   Diagnosis Date    Benign paroxysmal vertigo, unspecified ear 11/20/2018    BPV (benign positional vertigo)    Personal history of other diseases of the digestive system 04/06/2015    History of esophageal reflux     Past Surgical History:   Procedure Laterality Date    TRANSURETHRAL RESECTION OF PROSTATE  04/06/2015    Transurethral Resection Of Prostate (TURP)       Charting was completed using voice recognition technology and may include unintended errors.

## 2024-09-16 ENCOUNTER — CLINICAL SUPPORT (OUTPATIENT)
Dept: PRIMARY CARE | Facility: CLINIC | Age: 84
End: 2024-09-16
Payer: MEDICARE

## 2024-09-16 DIAGNOSIS — Z00.00 PREVENTATIVE HEALTH CARE: ICD-10-CM

## 2024-09-16 PROCEDURE — 90662 IIV NO PRSV INCREASED AG IM: CPT | Performed by: INTERNAL MEDICINE

## 2024-09-16 PROCEDURE — G0008 ADMIN INFLUENZA VIRUS VAC: HCPCS | Performed by: INTERNAL MEDICINE

## 2024-12-03 DIAGNOSIS — I10 ESSENTIAL HYPERTENSION: ICD-10-CM

## 2024-12-04 RX ORDER — HYDROCHLOROTHIAZIDE 12.5 MG/1
12.5 TABLET ORAL DAILY
Qty: 90 TABLET | Refills: 3 | Status: SHIPPED | OUTPATIENT
Start: 2024-12-04

## 2024-12-16 ENCOUNTER — APPOINTMENT (OUTPATIENT)
Dept: PRIMARY CARE | Facility: CLINIC | Age: 84
End: 2024-12-16
Payer: MEDICARE

## 2024-12-16 VITALS
OXYGEN SATURATION: 93 % | DIASTOLIC BLOOD PRESSURE: 85 MMHG | HEART RATE: 57 BPM | SYSTOLIC BLOOD PRESSURE: 158 MMHG | HEIGHT: 67 IN | BODY MASS INDEX: 29.73 KG/M2

## 2024-12-16 DIAGNOSIS — Z00.00 ROUTINE GENERAL MEDICAL EXAMINATION AT A HEALTH CARE FACILITY: ICD-10-CM

## 2024-12-16 DIAGNOSIS — Z00.00 WELLNESS EXAMINATION: Primary | ICD-10-CM

## 2024-12-16 DIAGNOSIS — E78.5 HYPERLIPIDEMIA, UNSPECIFIED HYPERLIPIDEMIA TYPE: ICD-10-CM

## 2024-12-16 DIAGNOSIS — I10 HYPERTENSION, UNSPECIFIED TYPE: ICD-10-CM

## 2024-12-16 DIAGNOSIS — R73.01 IMPAIRED FASTING GLUCOSE: ICD-10-CM

## 2024-12-16 PROCEDURE — 1159F MED LIST DOCD IN RCRD: CPT | Performed by: INTERNAL MEDICINE

## 2024-12-16 PROCEDURE — G0439 PPPS, SUBSEQ VISIT: HCPCS | Performed by: INTERNAL MEDICINE

## 2024-12-16 PROCEDURE — 3079F DIAST BP 80-89 MM HG: CPT | Performed by: INTERNAL MEDICINE

## 2024-12-16 PROCEDURE — 1170F FXNL STATUS ASSESSED: CPT | Performed by: INTERNAL MEDICINE

## 2024-12-16 PROCEDURE — 1160F RVW MEDS BY RX/DR IN RCRD: CPT | Performed by: INTERNAL MEDICINE

## 2024-12-16 PROCEDURE — 1036F TOBACCO NON-USER: CPT | Performed by: INTERNAL MEDICINE

## 2024-12-16 PROCEDURE — 99214 OFFICE O/P EST MOD 30 MIN: CPT | Performed by: INTERNAL MEDICINE

## 2024-12-16 PROCEDURE — 3077F SYST BP >= 140 MM HG: CPT | Performed by: INTERNAL MEDICINE

## 2024-12-16 RX ORDER — OMEPRAZOLE 20 MG/1
20 CAPSULE, DELAYED RELEASE ORAL DAILY
Qty: 90 CAPSULE | Refills: 3 | Status: SHIPPED | OUTPATIENT
Start: 2024-12-16

## 2024-12-16 RX ORDER — ATENOLOL 50 MG/1
50 TABLET ORAL DAILY
Qty: 90 TABLET | Refills: 3 | Status: SHIPPED | OUTPATIENT
Start: 2024-12-16

## 2024-12-16 RX ORDER — LUTEIN 6 MG
TABLET ORAL
Qty: 90 TABLET | Refills: 3 | Status: SHIPPED | OUTPATIENT
Start: 2024-12-16

## 2024-12-16 RX ORDER — LISINOPRIL 20 MG/1
20 TABLET ORAL DAILY
Qty: 90 TABLET | Refills: 3 | Status: SHIPPED | OUTPATIENT
Start: 2024-12-16

## 2024-12-16 ASSESSMENT — PATIENT HEALTH QUESTIONNAIRE - PHQ9
2. FEELING DOWN, DEPRESSED OR HOPELESS: NOT AT ALL
SUM OF ALL RESPONSES TO PHQ9 QUESTIONS 1 AND 2: 0
1. LITTLE INTEREST OR PLEASURE IN DOING THINGS: NOT AT ALL

## 2024-12-16 ASSESSMENT — ACTIVITIES OF DAILY LIVING (ADL)
BATHING: INDEPENDENT
DOING_HOUSEWORK: INDEPENDENT
DRESSING: INDEPENDENT
MANAGING_FINANCES: INDEPENDENT
GROCERY_SHOPPING: INDEPENDENT
TAKING_MEDICATION: INDEPENDENT

## 2024-12-16 NOTE — PROGRESS NOTES
Subjective   Patient ID: Gregory Juarez is a 84 y.o. male who presents for the following  chronic follow disease follow up and   Medicare wellness DECEMBER 2024  Assessment/Plan   Ecchymosis on the forearms: check inr    htn: stable, changed to lisinopril from quinapril      hld: TG elevated. patient will do diet modification     fatty liver disease: recommend diet and weight loss     BPH: getting psa through urologist < Dr Rafa FONSECA ordered      check cbc cmp, lipid panel and vit d     HPI  male with BPH, fatty infiltration of the liver, urinary outlet obstruction, status post TURP, IGT, HTN, hyperlipidemia, plantar fasciitis, GERD, metabolic syndrome, and BPPV comes for       bruising more frequently on his forarms noted. Denies any juan rectal bleeding    impaired fasting glucose a1c 5.6 . watching simple carbohydrates      HLD: Patient denies any muscle aches and is tolerating statin therapy     HTN: patient denies any headaches, blurred vision or dizziness. patient denies any stroke like symptoms.      bph: follows with dr carbone. s/p TURP. he gets psa done through urology      fatty liver disease: patient had a n ultrasound in 2015 with the diagnosis        to Fabiola     Visit Vitals  Smoking Status Former     PHYSICAL EXAM   General appearance: Alert and in no acute distress. speech is clear and coherent    Respiratory : No respiratory distress, normal respiratory rhythm and effort. Clear bilateral breath sounds. No wheezing or rhonchi.   Cardiovascular: heart rate regular, S1, S2. no murmurs. no Lower extremity edema  Skin inspection: Normal skin color and pigmentation, normal skin turgor and no visible rash, induration, or cellulitis. Ecchymosis on the upper forearms.   MSK: 5/5 strength upper and lower extremities without gait abnormalities. no loss of muscle mass   Neuro: 2-12 CN grossly intact.  no slurred speech. no lateralizing deficit  Psychiatric Orientation: Oriented to  person, place, and time. no depression, homicidal or suicidal thoughts, normal affect       REVIEW OF SYSTEMS   Constitutional: not feeling tired and no fever, chills or sweats. Denies weight loss  no headache  Cardiovascular: no exertional chest pain, no palpitations, no lower extremity edema    Lungs: Denies shortness of breath, exertional dyspnea, wheezing  Gastrointestinal: no change in bowel habits, no diarrhea, no nausea, no vomiting and no abdominal pain. Denies Melena, brbpr or dark stool  Musculoskeletal: no myalgias, no muscle weakness and no limb swelling.    Neurological: no headaches, no seizures, no numbness, no lateralizing deficits and no fainting.   Psychiatric: no depression and no anxiety.   Urine: denies polyuria, hematuria, dysuria       No Known Allergies    Current Outpatient Medications   Medication Sig Dispense Refill    atenolol (Tenormin) 50 mg tablet Take 1 tablet (50 mg) by mouth once daily. 90 tablet 3    hydroCHLOROthiazide (Microzide) 12.5 mg tablet TAKE ONE TABLET BY MOUTH EVERY DAY 90 tablet 3    lisinopril 20 mg tablet Take 1 tablet (20 mg) by mouth once daily. 90 tablet 3    lutein 6 mg tablet Use as directed 90 tablet 3    multivitamin tablet Take by mouth.      omeprazole (PriLOSEC) 20 mg DR capsule Take 1 capsule (20 mg) by mouth once daily. 90 capsule 3    Paxlovid 150-100 mg tablet therapy pack TK 1 NIRMATRELVIR T AND 1 RITONAVIR T TOGETHER PO BID FOR 5 DAYS BID FOR 5 DAYS      psyllium (Metamucil, sugar,) powder Take by mouth.       No current facility-administered medications for this visit.       Objective     No visits with results within 4 Month(s) from this visit.   Latest known visit with results is:   Lab on 06/12/2024   Component Date Value Ref Range Status    WBC 06/12/2024 8.1  4.4 - 11.3 x10*3/uL Final    nRBC 06/12/2024 0.0  0.0 - 0.0 /100 WBCs Final    RBC 06/12/2024 5.26  4.50 - 5.90 x10*6/uL Final    Hemoglobin 06/12/2024 15.4  13.5 - 17.5 g/dL Final     Hematocrit 06/12/2024 44.7  41.0 - 52.0 % Final    MCV 06/12/2024 85  80 - 100 fL Final    MCH 06/12/2024 29.3  26.0 - 34.0 pg Final    MCHC 06/12/2024 34.5  32.0 - 36.0 g/dL Final    RDW 06/12/2024 13.0  11.5 - 14.5 % Final    Platelets 06/12/2024 207  150 - 450 x10*3/uL Final    Glucose 06/12/2024 111 (H)  74 - 99 mg/dL Final    Sodium 06/12/2024 137  136 - 145 mmol/L Final    Potassium 06/12/2024 3.9  3.5 - 5.3 mmol/L Final    Chloride 06/12/2024 103  98 - 107 mmol/L Final    Bicarbonate 06/12/2024 22  21 - 32 mmol/L Final    Anion Gap 06/12/2024 16  10 - 20 mmol/L Final    Urea Nitrogen 06/12/2024 20  6 - 23 mg/dL Final    Creatinine 06/12/2024 1.01  0.50 - 1.30 mg/dL Final    eGFR 06/12/2024 73  >60 mL/min/1.73m*2 Final    Calcium 06/12/2024 10.0  8.6 - 10.6 mg/dL Final    Albumin 06/12/2024 4.6  3.4 - 5.0 g/dL Final    Alkaline Phosphatase 06/12/2024 57  33 - 136 U/L Final    Total Protein 06/12/2024 6.9  6.4 - 8.2 g/dL Final    AST 06/12/2024 24  9 - 39 U/L Final    Bilirubin, Total 06/12/2024 0.8  0.0 - 1.2 mg/dL Final    ALT 06/12/2024 23  10 - 52 U/L Final    Hemoglobin A1C 06/12/2024 5.6  see below % Final    Estimated Average Glucose 06/12/2024 114  Not Established mg/dL Final    Thyroid Stimulating Hormone 06/12/2024 1.43  0.44 - 3.98 mIU/L Final    Protime 06/12/2024 12.1  9.8 - 12.8 seconds Final    INR 06/12/2024 1.1  0.9 - 1.1 Final       Radiology: Reviewed imaging in powerchart.  No results found.    No family history on file.  Social History     Socioeconomic History    Marital status:    Tobacco Use    Smoking status: Former     Types: Cigarettes    Smokeless tobacco: Never   Substance and Sexual Activity    Alcohol use: Not Currently     Past Medical History:   Diagnosis Date    Benign paroxysmal vertigo, unspecified ear 11/20/2018    BPV (benign positional vertigo)    Personal history of other diseases of the digestive system 04/06/2015    History of esophageal reflux     Past Surgical  History:   Procedure Laterality Date    TRANSURETHRAL RESECTION OF PROSTATE  04/06/2015    Transurethral Resection Of Prostate (TURP)       Charting was completed using voice recognition technology and may include unintended errors.

## 2024-12-28 DIAGNOSIS — I10 HYPERTENSION, UNSPECIFIED TYPE: ICD-10-CM

## 2024-12-30 ENCOUNTER — TELEPHONE (OUTPATIENT)
Dept: PRIMARY CARE | Facility: CLINIC | Age: 84
End: 2024-12-30

## 2024-12-30 DIAGNOSIS — I10 HYPERTENSION, UNSPECIFIED TYPE: ICD-10-CM

## 2024-12-30 RX ORDER — LISINOPRIL 20 MG/1
20 TABLET ORAL DAILY
Qty: 90 TABLET | Refills: 3 | Status: SHIPPED | OUTPATIENT
Start: 2024-12-30

## 2024-12-30 RX ORDER — LISINOPRIL 20 MG/1
20 TABLET ORAL DAILY
Qty: 90 TABLET | Refills: 3 | Status: CANCELLED | OUTPATIENT
Start: 2024-12-30

## 2025-03-17 ENCOUNTER — TELEPHONE (OUTPATIENT)
Dept: PRIMARY CARE | Facility: CLINIC | Age: 85
End: 2025-03-17
Payer: MEDICARE

## 2025-03-17 DIAGNOSIS — I10 HYPERTENSION, UNSPECIFIED TYPE: ICD-10-CM

## 2025-03-18 RX ORDER — ATENOLOL 50 MG/1
50 TABLET ORAL DAILY
Qty: 90 TABLET | Refills: 3 | Status: SHIPPED | OUTPATIENT
Start: 2025-03-18

## 2025-03-26 LAB
ALBUMIN SERPL-MCNC: 4.6 G/DL (ref 3.6–5.1)
ALP SERPL-CCNC: 52 U/L (ref 35–144)
ALT SERPL-CCNC: 28 U/L (ref 9–46)
ANION GAP SERPL CALCULATED.4IONS-SCNC: 11 MMOL/L (CALC) (ref 7–17)
AST SERPL-CCNC: 27 U/L (ref 10–35)
BILIRUB SERPL-MCNC: 0.8 MG/DL (ref 0.2–1.2)
BUN SERPL-MCNC: 17 MG/DL (ref 7–25)
CALCIUM SERPL-MCNC: 10 MG/DL (ref 8.6–10.3)
CHLORIDE SERPL-SCNC: 101 MMOL/L (ref 98–110)
CHOLEST SERPL-MCNC: 158 MG/DL
CHOLEST/HDLC SERPL: 3.8 (CALC)
CO2 SERPL-SCNC: 27 MMOL/L (ref 20–32)
CREAT SERPL-MCNC: 1.18 MG/DL (ref 0.7–1.22)
EGFRCR SERPLBLD CKD-EPI 2021: 61 ML/MIN/1.73M2
ERYTHROCYTE [DISTWIDTH] IN BLOOD BY AUTOMATED COUNT: 12.8 % (ref 11–15)
EST. AVERAGE GLUCOSE BLD GHB EST-MCNC: 117 MG/DL
EST. AVERAGE GLUCOSE BLD GHB EST-SCNC: 6.5 MMOL/L
GLUCOSE SERPL-MCNC: 126 MG/DL (ref 65–99)
HBA1C MFR BLD: 5.7 % OF TOTAL HGB
HCT VFR BLD AUTO: 46.4 % (ref 38.5–50)
HDLC SERPL-MCNC: 42 MG/DL
HGB BLD-MCNC: 15.8 G/DL (ref 13.2–17.1)
LDLC SERPL CALC-MCNC: 83 MG/DL (CALC)
MCH RBC QN AUTO: 29.8 PG (ref 27–33)
MCHC RBC AUTO-ENTMCNC: 34.1 G/DL (ref 32–36)
MCV RBC AUTO: 87.4 FL (ref 80–100)
NONHDLC SERPL-MCNC: 116 MG/DL (CALC)
PLATELET # BLD AUTO: 213 THOUSAND/UL (ref 140–400)
PMV BLD REES-ECKER: 10.1 FL (ref 7.5–12.5)
POTASSIUM SERPL-SCNC: 4.5 MMOL/L (ref 3.5–5.3)
PROT SERPL-MCNC: 6.9 G/DL (ref 6.1–8.1)
RBC # BLD AUTO: 5.31 MILLION/UL (ref 4.2–5.8)
SODIUM SERPL-SCNC: 139 MMOL/L (ref 135–146)
TRIGL SERPL-MCNC: 241 MG/DL
TSH SERPL-ACNC: 2.07 MIU/L (ref 0.4–4.5)
WBC # BLD AUTO: 8.2 THOUSAND/UL (ref 3.8–10.8)

## 2025-03-27 ENCOUNTER — TELEPHONE (OUTPATIENT)
Dept: PRIMARY CARE | Facility: CLINIC | Age: 85
End: 2025-03-27
Payer: MEDICARE

## 2025-03-27 NOTE — TELEPHONE ENCOUNTER
----- Message from Robinson Smith sent at 3/27/2025  7:24 AM EDT -----  triglycerides are elevated, please limit simple carbohydrates and continue to do lifestyle modifications including dieting and exercising

## 2025-05-14 ENCOUNTER — APPOINTMENT (OUTPATIENT)
Dept: PRIMARY CARE | Facility: CLINIC | Age: 85
End: 2025-05-14
Payer: MEDICARE

## 2025-05-14 VITALS
DIASTOLIC BLOOD PRESSURE: 76 MMHG | WEIGHT: 194 LBS | HEART RATE: 70 BPM | OXYGEN SATURATION: 93 % | HEIGHT: 67 IN | BODY MASS INDEX: 30.45 KG/M2 | SYSTOLIC BLOOD PRESSURE: 166 MMHG

## 2025-05-14 DIAGNOSIS — R73.01 IMPAIRED FASTING GLUCOSE: ICD-10-CM

## 2025-05-14 DIAGNOSIS — I10 ESSENTIAL HYPERTENSION: ICD-10-CM

## 2025-05-14 DIAGNOSIS — I10 HYPERTENSION, UNSPECIFIED TYPE: ICD-10-CM

## 2025-05-14 DIAGNOSIS — E78.5 HYPERLIPIDEMIA, UNSPECIFIED HYPERLIPIDEMIA TYPE: Primary | ICD-10-CM

## 2025-05-14 PROCEDURE — 1159F MED LIST DOCD IN RCRD: CPT | Performed by: INTERNAL MEDICINE

## 2025-05-14 PROCEDURE — 1036F TOBACCO NON-USER: CPT | Performed by: INTERNAL MEDICINE

## 2025-05-14 PROCEDURE — 99214 OFFICE O/P EST MOD 30 MIN: CPT | Performed by: INTERNAL MEDICINE

## 2025-05-14 PROCEDURE — 1123F ACP DISCUSS/DSCN MKR DOCD: CPT | Performed by: INTERNAL MEDICINE

## 2025-05-14 PROCEDURE — G2211 COMPLEX E/M VISIT ADD ON: HCPCS | Performed by: INTERNAL MEDICINE

## 2025-05-14 PROCEDURE — 3078F DIAST BP <80 MM HG: CPT | Performed by: INTERNAL MEDICINE

## 2025-05-14 PROCEDURE — 3077F SYST BP >= 140 MM HG: CPT | Performed by: INTERNAL MEDICINE

## 2025-05-14 RX ORDER — HYDROCHLOROTHIAZIDE 25 MG/1
25 TABLET ORAL DAILY
Qty: 90 TABLET | Refills: 3 | Status: SHIPPED | OUTPATIENT
Start: 2025-05-14

## 2025-05-14 NOTE — PROGRESS NOTES
Subjective   Patient ID: Gregory Juarez is a 85 y.o. male who presents for the following  chronic follow disease follow up    Medicare wellness DECEMBER 2024  Assessment/Plan   htn: stable,   Hydrochlorothiazide 12.5mg daily --> 25mg daily 5/14/25  Lisinopril 20mg daily      hld: TG elevated. patient will do diet modification     fatty liver disease: recommend diet and weight loss     BPH: getting psa through urologist, Dr Rafa FONSECA ordered      check cbc cmp, lipid panel and vit d     HPI  male with BPH, fatty infiltration of the liver, urinary outlet obstruction, status post TURP, IGT, HTN, hyperlipidemia, plantar fasciitis, GERD, metabolic syndrome, and BPPV comes for       bruising more frequently on his forarms noted. Denies any juan rectal bleeding    impaired fasting glucose a1c 5.6 . watching simple carbohydrates      HLD: Patient denies any muscle aches and is tolerating statin therapy     HTN: patient denies any headaches, blurred vision or dizziness. patient denies any stroke like symptoms.      bph: follows with dr carbone. s/p TURP. he gets psa done through urology      fatty liver disease: patient had a n ultrasound in 2015 with the diagnosis        to Fabiola     Visit Vitals  Smoking Status Former     PHYSICAL EXAM   General appearance: Alert and in no acute distress. speech is clear and coherent    Respiratory : No respiratory distress, normal respiratory rhythm and effort. Clear bilateral breath sounds. No wheezing or rhonchi.   Cardiovascular: heart rate regular, S1, S2. no murmurs. no Lower extremity edema  Skin inspection: Normal skin color and pigmentation, normal skin turgor and no visible rash, induration, or cellulitis. Ecchymosis on the upper forearms.   MSK: 5/5 strength upper and lower extremities without gait abnormalities. no loss of muscle mass   Neuro: 2-12 CN grossly intact.  no slurred speech. no lateralizing deficit  Psychiatric Orientation: Oriented to  person, place, and time. no depression, homicidal or suicidal thoughts, normal affect       REVIEW OF SYSTEMS   Constitutional: not feeling tired and no fever, chills or sweats. Denies weight loss  no headache  Cardiovascular: no exertional chest pain, no palpitations, no lower extremity edema    Lungs: Denies shortness of breath, exertional dyspnea, wheezing  Gastrointestinal: no change in bowel habits, no diarrhea, no nausea, no vomiting and no abdominal pain. Denies Melena, brbpr or dark stool  Musculoskeletal: no myalgias, no muscle weakness and no limb swelling.    Neurological: no headaches, no seizures, no numbness, no lateralizing deficits and no fainting.   Psychiatric: no depression and no anxiety.   Urine: denies polyuria, hematuria, dysuria       No Known Allergies    Current Outpatient Medications   Medication Sig Dispense Refill    atenolol (Tenormin) 50 mg tablet Take 1 tablet (50 mg) by mouth once daily. 90 tablet 3    hydroCHLOROthiazide (Microzide) 12.5 mg tablet TAKE ONE TABLET BY MOUTH EVERY DAY 90 tablet 3    lisinopril 20 mg tablet TAKE ONE TABLET BY MOUTH EVERY DAY 90 tablet 3    lutein 6 mg tablet Use as directed 90 tablet 3    multivitamin tablet Take by mouth.      omeprazole (PriLOSEC) 20 mg DR capsule Take 1 capsule (20 mg) by mouth once daily. 90 capsule 3    Paxlovid 150-100 mg tablet therapy pack TK 1 NIRMATRELVIR T AND 1 RITONAVIR T TOGETHER PO BID FOR 5 DAYS BID FOR 5 DAYS      psyllium (Metamucil, sugar,) powder Take by mouth.       No current facility-administered medications for this visit.       Objective     Orders Only on 03/26/2025   Component Date Value Ref Range Status    CHOLESTEROL, TOTAL 03/26/2025 158  <200 mg/dL Final    HDL CHOLESTEROL 03/26/2025 42  > OR = 40 mg/dL Final    TRIGLYCERIDES 03/26/2025 241 (H)  <150 mg/dL Final    LDL-CHOLESTEROL 03/26/2025 83  mg/dL (calc) Final    CHOL/HDLC RATIO 03/26/2025 3.8  <5.0 (calc) Final    NON HDL CHOLESTEROL 03/26/2025 116   <130 mg/dL (calc) Final    GLUCOSE 03/26/2025 126 (H)  65 - 99 mg/dL Final    UREA NITROGEN (BUN) 03/26/2025 17  7 - 25 mg/dL Final    CREATININE 03/26/2025 1.18  0.70 - 1.22 mg/dL Final    EGFR 03/26/2025 61  > OR = 60 mL/min/1.73m2 Final    SODIUM 03/26/2025 139  135 - 146 mmol/L Final    POTASSIUM 03/26/2025 4.5  3.5 - 5.3 mmol/L Final    CHLORIDE 03/26/2025 101  98 - 110 mmol/L Final    CARBON DIOXIDE 03/26/2025 27  20 - 32 mmol/L Final    ELECTROLYTE BALANCE 03/26/2025 11  7 - 17 mmol/L (calc) Final    CALCIUM 03/26/2025 10.0  8.6 - 10.3 mg/dL Final    PROTEIN, TOTAL 03/26/2025 6.9  6.1 - 8.1 g/dL Final    ALBUMIN 03/26/2025 4.6  3.6 - 5.1 g/dL Final    BILIRUBIN, TOTAL 03/26/2025 0.8  0.2 - 1.2 mg/dL Final    ALKALINE PHOSPHATASE 03/26/2025 52  35 - 144 U/L Final    AST 03/26/2025 27  10 - 35 U/L Final    ALT 03/26/2025 28  9 - 46 U/L Final    WHITE BLOOD CELL COUNT 03/26/2025 8.2  3.8 - 10.8 Thousand/uL Final    RED BLOOD CELL COUNT 03/26/2025 5.31  4.20 - 5.80 Million/uL Final    HEMOGLOBIN 03/26/2025 15.8  13.2 - 17.1 g/dL Final    HEMATOCRIT 03/26/2025 46.4  38.5 - 50.0 % Final    MCV 03/26/2025 87.4  80.0 - 100.0 fL Final    MCH 03/26/2025 29.8  27.0 - 33.0 pg Final    MCHC 03/26/2025 34.1  32.0 - 36.0 g/dL Final    RDW 03/26/2025 12.8  11.0 - 15.0 % Final    PLATELET COUNT 03/26/2025 213  140 - 400 Thousand/uL Final    MPV 03/26/2025 10.1  7.5 - 12.5 fL Final    TSH W/REFLEX TO FT4 03/26/2025 2.07  0.40 - 4.50 mIU/L Final    HEMOGLOBIN A1c 03/26/2025 5.7 (H)  <5.7 % of total Hgb Final    eAG (mg/dL) 03/26/2025 117  mg/dL Final    eAG (mmol/L) 03/26/2025 6.5  mmol/L Final       Radiology: Reviewed imaging in powerchart.  No results found.    No family history on file.  Social History     Socioeconomic History    Marital status:    Tobacco Use    Smoking status: Former     Types: Cigarettes    Smokeless tobacco: Never   Substance and Sexual Activity    Alcohol use: Not Currently    Drug use: Never      Past Medical History:   Diagnosis Date    Benign paroxysmal vertigo, unspecified ear 11/20/2018    BPV (benign positional vertigo)    Personal history of other diseases of the digestive system 04/06/2015    History of esophageal reflux     Past Surgical History:   Procedure Laterality Date    TRANSURETHRAL RESECTION OF PROSTATE  04/06/2015    Transurethral Resection Of Prostate (TURP)       Charting was completed using voice recognition technology and may include unintended errors.

## 2025-05-29 ENCOUNTER — APPOINTMENT (OUTPATIENT)
Dept: PRIMARY CARE | Facility: CLINIC | Age: 85
End: 2025-05-29
Payer: MEDICARE

## 2025-05-29 VITALS
OXYGEN SATURATION: 97 % | SYSTOLIC BLOOD PRESSURE: 155 MMHG | HEIGHT: 67 IN | HEART RATE: 69 BPM | DIASTOLIC BLOOD PRESSURE: 78 MMHG | BODY MASS INDEX: 30.45 KG/M2 | WEIGHT: 194 LBS

## 2025-05-29 DIAGNOSIS — I10 PRIMARY HYPERTENSION: Primary | ICD-10-CM

## 2025-05-29 LAB
ANION GAP SERPL CALCULATED.4IONS-SCNC: 10 MMOL/L (CALC) (ref 7–17)
BUN SERPL-MCNC: 19 MG/DL (ref 7–25)
BUN/CREAT SERPL: ABNORMAL (CALC) (ref 6–22)
CALCIUM SERPL-MCNC: 9.6 MG/DL (ref 8.6–10.3)
CHLORIDE SERPL-SCNC: 102 MMOL/L (ref 98–110)
CO2 SERPL-SCNC: 25 MMOL/L (ref 20–32)
CREAT SERPL-MCNC: 1.1 MG/DL (ref 0.7–1.22)
EGFRCR SERPLBLD CKD-EPI 2021: 66 ML/MIN/1.73M2
GLUCOSE SERPL-MCNC: 116 MG/DL (ref 65–99)
POTASSIUM SERPL-SCNC: 4 MMOL/L (ref 3.5–5.3)
SODIUM SERPL-SCNC: 137 MMOL/L (ref 135–146)

## 2025-05-29 PROCEDURE — 1159F MED LIST DOCD IN RCRD: CPT | Performed by: INTERNAL MEDICINE

## 2025-05-29 PROCEDURE — 3078F DIAST BP <80 MM HG: CPT | Performed by: INTERNAL MEDICINE

## 2025-05-29 PROCEDURE — 3077F SYST BP >= 140 MM HG: CPT | Performed by: INTERNAL MEDICINE

## 2025-05-29 PROCEDURE — 99214 OFFICE O/P EST MOD 30 MIN: CPT | Performed by: INTERNAL MEDICINE

## 2025-05-29 RX ORDER — NEBIVOLOL 2.5 MG/1
2.5 TABLET ORAL DAILY
Qty: 90 TABLET | Refills: 3 | Status: SHIPPED | OUTPATIENT
Start: 2025-05-29 | End: 2026-05-29

## 2025-05-29 NOTE — PROGRESS NOTES
"Subjective   Patient ID: Gregory Juarez is a 85 y.o. male who presents for the following  chronic follow disease follow up    Medicare wellness DECEMBER 2024  Assessment/Plan   htn: stable,   Hydrochlorothiazide 12.5mg daily --> 25mg daily 5/14/25  Lisinopril 20mg daily   Atenolol stop (5/29/25)  Start bystolic 2.5mg daily (please increase to 5mg daily in 1 week if SBP < 140s)     follow up in 3 months     hld: TG elevated. patient will do diet modification     fatty liver disease: recommend diet and weight loss     BPH: getting psa through urologist, Dr Rafa FONSECA ordered      check cbc cmp, lipid panel and vit d     HPI  male with BPH, fatty infiltration of the liver, urinary outlet obstruction, status post TURP, IGT, HTN, hyperlipidemia, plantar fasciitis, GERD, metabolic syndrome, and BPPV comes for       bruising more frequently on his forarms noted. Denies any juan rectal bleeding    impaired fasting glucose a1c 5.6 . watching simple carbohydrates      HLD: Patient denies any muscle aches and is tolerating statin therapy     HTN: patient denies any headaches, blurred vision or dizziness. patient denies any stroke like symptoms. Sbp at home still 150s      bph: follows with dr carbone. s/p TURP. he gets psa done through urology      fatty liver disease: patient had a n ultrasound in 2015 with the diagnosis        to Fabiola     Visit Vitals  /78 (BP Location: Left arm, Patient Position: Sitting, BP Cuff Size: Adult)   Pulse 69   Ht 1.689 m (5' 6.5\")   Wt 88 kg (194 lb)   SpO2 97%   BMI 30.84 kg/m²   Smoking Status Former   BSA 2.03 m²     PHYSICAL EXAM   General appearance: Alert and in no acute distress. speech is clear and coherent    Respiratory : No respiratory distress, normal respiratory rhythm and effort. Clear bilateral breath sounds. No wheezing or rhonchi.   Cardiovascular: heart rate regular, S1, S2. no murmurs. no Lower extremity edema  Skin inspection: Normal skin " color and pigmentation, normal skin turgor and no visible rash, induration, or cellulitis.    MSK: 5/5 strength upper and lower extremities without gait abnormalities. no loss of muscle mass   Neuro: 2-12 CN grossly intact.  no slurred speech. no lateralizing deficit  Psychiatric Orientation: Oriented to person, place, and time. no depression, homicidal or suicidal thoughts, normal affect       REVIEW OF SYSTEMS   Constitutional: not feeling tired and no fever, chills or sweats. Denies weight loss  no headache  Cardiovascular: no exertional chest pain, no palpitations, no lower extremity edema    Lungs: Denies shortness of breath, exertional dyspnea, wheezing  Gastrointestinal: no change in bowel habits, no diarrhea, no nausea, no vomiting and no abdominal pain.    Musculoskeletal: no myalgias, no muscle weakness and no limb swelling.    Neurological: no headaches, no seizures, no numbness, no lateralizing deficits and no fainting.   Psychiatric: no depression and no anxiety.   Urine: denies polyuria, hematuria, dysuria       No Known Allergies    Current Outpatient Medications   Medication Sig Dispense Refill    atenolol (Tenormin) 50 mg tablet Take 1 tablet (50 mg) by mouth once daily. 90 tablet 3    hydroCHLOROthiazide (HYDRODiuril) 25 mg tablet Take 1 tablet (25 mg) by mouth once daily. 90 tablet 3    lisinopril 20 mg tablet TAKE ONE TABLET BY MOUTH EVERY DAY 90 tablet 3    lutein 6 mg tablet Use as directed 90 tablet 3    multivitamin tablet Take by mouth.      omeprazole (PriLOSEC) 20 mg DR capsule Take 1 capsule (20 mg) by mouth once daily. 90 capsule 3    Paxlovid 150-100 mg tablet therapy pack TK 1 NIRMATRELVIR T AND 1 RITONAVIR T TOGETHER PO BID FOR 5 DAYS BID FOR 5 DAYS      psyllium (Metamucil, sugar,) powder Take by mouth.       No current facility-administered medications for this visit.       Objective     Orders Only on 03/26/2025   Component Date Value Ref Range Status    CHOLESTEROL, TOTAL  03/26/2025 158  <200 mg/dL Final    HDL CHOLESTEROL 03/26/2025 42  > OR = 40 mg/dL Final    TRIGLYCERIDES 03/26/2025 241 (H)  <150 mg/dL Final    LDL-CHOLESTEROL 03/26/2025 83  mg/dL (calc) Final    CHOL/HDLC RATIO 03/26/2025 3.8  <5.0 (calc) Final    NON HDL CHOLESTEROL 03/26/2025 116  <130 mg/dL (calc) Final    GLUCOSE 03/26/2025 126 (H)  65 - 99 mg/dL Final    UREA NITROGEN (BUN) 03/26/2025 17  7 - 25 mg/dL Final    CREATININE 03/26/2025 1.18  0.70 - 1.22 mg/dL Final    EGFR 03/26/2025 61  > OR = 60 mL/min/1.73m2 Final    SODIUM 03/26/2025 139  135 - 146 mmol/L Final    POTASSIUM 03/26/2025 4.5  3.5 - 5.3 mmol/L Final    CHLORIDE 03/26/2025 101  98 - 110 mmol/L Final    CARBON DIOXIDE 03/26/2025 27  20 - 32 mmol/L Final    ELECTROLYTE BALANCE 03/26/2025 11  7 - 17 mmol/L (calc) Final    CALCIUM 03/26/2025 10.0  8.6 - 10.3 mg/dL Final    PROTEIN, TOTAL 03/26/2025 6.9  6.1 - 8.1 g/dL Final    ALBUMIN 03/26/2025 4.6  3.6 - 5.1 g/dL Final    BILIRUBIN, TOTAL 03/26/2025 0.8  0.2 - 1.2 mg/dL Final    ALKALINE PHOSPHATASE 03/26/2025 52  35 - 144 U/L Final    AST 03/26/2025 27  10 - 35 U/L Final    ALT 03/26/2025 28  9 - 46 U/L Final    WHITE BLOOD CELL COUNT 03/26/2025 8.2  3.8 - 10.8 Thousand/uL Final    RED BLOOD CELL COUNT 03/26/2025 5.31  4.20 - 5.80 Million/uL Final    HEMOGLOBIN 03/26/2025 15.8  13.2 - 17.1 g/dL Final    HEMATOCRIT 03/26/2025 46.4  38.5 - 50.0 % Final    MCV 03/26/2025 87.4  80.0 - 100.0 fL Final    MCH 03/26/2025 29.8  27.0 - 33.0 pg Final    MCHC 03/26/2025 34.1  32.0 - 36.0 g/dL Final    RDW 03/26/2025 12.8  11.0 - 15.0 % Final    PLATELET COUNT 03/26/2025 213  140 - 400 Thousand/uL Final    MPV 03/26/2025 10.1  7.5 - 12.5 fL Final    TSH W/REFLEX TO FT4 03/26/2025 2.07  0.40 - 4.50 mIU/L Final    HEMOGLOBIN A1c 03/26/2025 5.7 (H)  <5.7 % of total Hgb Final    eAG (mg/dL) 03/26/2025 117  mg/dL Final    eAG (mmol/L) 03/26/2025 6.5  mmol/L Final       Radiology: Reviewed imaging in  powerchart.  No results found.    No family history on file.  Social History     Socioeconomic History    Marital status:    Tobacco Use    Smoking status: Former     Types: Cigarettes    Smokeless tobacco: Never   Substance and Sexual Activity    Alcohol use: Not Currently    Drug use: Never     Past Medical History:   Diagnosis Date    Benign paroxysmal vertigo, unspecified ear 11/20/2018    BPV (benign positional vertigo)    Personal history of other diseases of the digestive system 04/06/2015    History of esophageal reflux     Past Surgical History:   Procedure Laterality Date    TRANSURETHRAL RESECTION OF PROSTATE  04/06/2015    Transurethral Resection Of Prostate (TURP)       Charting was completed using voice recognition technology and may include unintended errors.

## 2025-07-14 ENCOUNTER — TELEPHONE (OUTPATIENT)
Dept: PRIMARY CARE | Facility: CLINIC | Age: 85
End: 2025-07-14

## 2025-07-14 DIAGNOSIS — I10 PRIMARY HYPERTENSION: ICD-10-CM

## 2025-07-14 RX ORDER — NEBIVOLOL 5 MG/1
5 TABLET ORAL DAILY
Qty: 90 TABLET | Refills: 3 | Status: SHIPPED | OUTPATIENT
Start: 2025-07-14 | End: 2026-07-14

## 2025-11-03 ENCOUNTER — APPOINTMENT (OUTPATIENT)
Dept: PRIMARY CARE | Facility: CLINIC | Age: 85
End: 2025-11-03
Payer: MEDICARE